# Patient Record
Sex: FEMALE | Race: BLACK OR AFRICAN AMERICAN | Employment: FULL TIME | URBAN - METROPOLITAN AREA
[De-identification: names, ages, dates, MRNs, and addresses within clinical notes are randomized per-mention and may not be internally consistent; named-entity substitution may affect disease eponyms.]

---
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
## 2019-03-07 ENCOUNTER — APPOINTMENT (OUTPATIENT)
Dept: CT IMAGING | Age: 19
End: 2019-03-07
Attending: EMERGENCY MEDICINE
Payer: MEDICAID

## 2019-03-07 ENCOUNTER — HOSPITAL ENCOUNTER (EMERGENCY)
Age: 19
Discharge: HOME | End: 2019-03-07
Attending: PEDIATRICS
Payer: MEDICAID

## 2019-03-07 VITALS
SYSTOLIC BLOOD PRESSURE: 116 MMHG | TEMPERATURE: 99 F | OXYGEN SATURATION: 98 % | RESPIRATION RATE: 16 BRPM | WEIGHT: 132.28 LBS | HEART RATE: 77 BPM | BODY MASS INDEX: 26.72 KG/M2 | DIASTOLIC BLOOD PRESSURE: 72 MMHG

## 2019-03-07 DIAGNOSIS — L03.211 CELLULITIS OF FACE: Primary | ICD-10-CM

## 2019-03-07 LAB
ANION GAP BLD CALC-SCNC: 17 MMOL/L (ref 10–20)
BASOPHILS # BLD: 0.1 K/UL (ref 0–0.1)
BASOPHILS NFR BLD: 1 % (ref 0–1)
BUN BLD-MCNC: 4 MG/DL (ref 9–20)
CA-I BLD-MCNC: 1.24 MMOL/L (ref 1.12–1.32)
CHLORIDE BLD-SCNC: 104 MMOL/L (ref 98–107)
CO2 BLD-SCNC: 27 MMOL/L (ref 21–32)
COMMENT, HOLDF: NORMAL
CREAT BLD-MCNC: 0.5 MG/DL (ref 0.6–1.3)
DIFFERENTIAL METHOD BLD: ABNORMAL
EOSINOPHIL # BLD: 0.2 K/UL (ref 0–0.4)
EOSINOPHIL NFR BLD: 4 % (ref 0–7)
ERYTHROCYTE [DISTWIDTH] IN BLOOD BY AUTOMATED COUNT: 13.4 % (ref 11.5–14.5)
GLUCOSE BLD-MCNC: 63 MG/DL (ref 65–100)
HCT VFR BLD AUTO: 30.2 % (ref 35–47)
HCT VFR BLD CALC: 30 % (ref 35–47)
HGB BLD-MCNC: 9.1 G/DL (ref 11.5–16)
IMM GRANULOCYTES # BLD AUTO: 0 K/UL (ref 0–0.04)
IMM GRANULOCYTES NFR BLD AUTO: 0 % (ref 0–0.5)
LYMPHOCYTES # BLD: 1.3 K/UL (ref 0.8–3.5)
LYMPHOCYTES NFR BLD: 23 % (ref 12–49)
MCH RBC QN AUTO: 25 PG (ref 26–34)
MCHC RBC AUTO-ENTMCNC: 30.1 G/DL (ref 30–36.5)
MCV RBC AUTO: 83 FL (ref 80–99)
MONOCYTES # BLD: 0.4 K/UL (ref 0–1)
MONOCYTES NFR BLD: 7 % (ref 5–13)
NEUTS SEG # BLD: 3.6 K/UL (ref 1.8–8)
NEUTS SEG NFR BLD: 65 % (ref 32–75)
NRBC # BLD: 0 K/UL (ref 0–0.01)
NRBC BLD-RTO: 0 PER 100 WBC
PLATELET # BLD AUTO: 379 K/UL (ref 150–400)
PMV BLD AUTO: 10.4 FL (ref 8.9–12.9)
POTASSIUM BLD-SCNC: 3.4 MMOL/L (ref 3.5–5.1)
RBC # BLD AUTO: 3.64 M/UL (ref 3.8–5.2)
SAMPLES BEING HELD,HOLD: NORMAL
SERVICE CMNT-IMP: ABNORMAL
SODIUM BLD-SCNC: 143 MMOL/L (ref 136–145)
WBC # BLD AUTO: 5.5 K/UL (ref 3.6–11)

## 2019-03-07 PROCEDURE — 80047 BASIC METABLC PNL IONIZED CA: CPT

## 2019-03-07 PROCEDURE — 99284 EMERGENCY DEPT VISIT MOD MDM: CPT

## 2019-03-07 PROCEDURE — 85025 COMPLETE CBC W/AUTO DIFF WBC: CPT

## 2019-03-07 PROCEDURE — 74011636320 HC RX REV CODE- 636/320: Performed by: PEDIATRICS

## 2019-03-07 PROCEDURE — 36415 COLL VENOUS BLD VENIPUNCTURE: CPT

## 2019-03-07 PROCEDURE — 70491 CT SOFT TISSUE NECK W/DYE: CPT

## 2019-03-07 PROCEDURE — 74011000258 HC RX REV CODE- 258: Performed by: PEDIATRICS

## 2019-03-07 RX ORDER — IBUPROFEN 600 MG/1
600 TABLET ORAL
Qty: 20 TAB | Refills: 0 | OUTPATIENT
Start: 2019-03-07 | End: 2020-10-27

## 2019-03-07 RX ORDER — CLINDAMYCIN HYDROCHLORIDE 150 MG/1
300 CAPSULE ORAL EVERY 6 HOURS
Status: DISCONTINUED | OUTPATIENT
Start: 2019-03-07 | End: 2019-03-07

## 2019-03-07 RX ORDER — ACETAMINOPHEN 325 MG/1
650 TABLET ORAL
Status: DISCONTINUED | OUTPATIENT
Start: 2019-03-07 | End: 2019-03-07

## 2019-03-07 RX ORDER — CLINDAMYCIN HYDROCHLORIDE 150 MG/1
600 CAPSULE ORAL
Status: DISCONTINUED | OUTPATIENT
Start: 2019-03-07 | End: 2019-03-07

## 2019-03-07 RX ORDER — SODIUM CHLORIDE 0.9 % (FLUSH) 0.9 %
10 SYRINGE (ML) INJECTION
Status: COMPLETED | OUTPATIENT
Start: 2019-03-07 | End: 2019-03-07

## 2019-03-07 RX ADMIN — Medication 10 ML: at 13:43

## 2019-03-07 RX ADMIN — IOPAMIDOL 100 ML: 612 INJECTION, SOLUTION INTRAVENOUS at 13:43

## 2019-03-07 RX ADMIN — SODIUM CHLORIDE 100 ML: 900 INJECTION, SOLUTION INTRAVENOUS at 13:43

## 2019-03-07 NOTE — ED PROVIDER NOTES
23 YO F  here for eval of abscess, which started approx 4 months ago. She was given PO abx which resolved the abscess. The abscess is noted to the left side of her face. She noticed in February she had swelling and tenderness at the same sight however the redness started last week. Denies fever,chills, cough, congestion. No meds PTA. Immunizations: UTD  PMH: HTN  Surgery: n/a  NKA      The history is provided by the patient. Past Medical History:   Diagnosis Date    Diabetes ([facility] [address]     Type II, pt states border-line diabetic (denied diabetes on 18 visit)    Hypertension        History reviewed. No pertinent surgical history. History reviewed. No pertinent family history. Social History     Socioeconomic History    Marital status: SINGLE     Spouse name: Not on file    Number of children: Not on file    Years of education: Not on file    Highest education level: Not on file   Social Needs    Financial resource strain: Not on file    Food insecurity - worry: Not on file    Food insecurity - inability: Not on file    Transportation needs - medical: Not on file   [employer] needs - non-medical: Not on file   Occupational History    Not on file   Tobacco Use    Smoking status: Current Every Day Smoker     Packs/day: 0.25    Smokeless tobacco: Never Used    Tobacco comment: 3-5 black and milds a day   Substance and Sexual Activity    Alcohol use: No    Drug use: No    Sexual activity: Yes     Partners: Male     Birth control/protection: None   Other Topics Concern    Not on file   Social History Narrative    Not on file         ALLERGIES: Patient has no known allergies. Review of Systems   Constitutional: Negative for activity change, appetite change, chills and fever. HENT: Negative for congestion. Respiratory: Negative for cough. Gastrointestinal: Negative for diarrhea, nausea and vomiting. Endocrine: Negative. Genitourinary: Negative.     Skin: Positive for wound. Neurological: Negative. All other systems reviewed and are negative. Vitals:    03/07/19 1106 03/07/19 1108   BP: 120/64    Pulse: 83    Resp: 18    Temp: 98.4 °F (36.9 °C)    SpO2: 100%    Weight:  60 kg (132 lb 4.4 oz)            Physical Exam   Constitutional: She is oriented to person, place, and time. She appears well-developed and well-nourished. No distress. HENT:   Head: Normocephalic and atraumatic. Mouth/Throat: Oropharynx is clear and moist. Dental caries present. Large, red, raised area noted to left side of face along mandible. Eyes: EOM are normal.   Neck: Normal range of motion. Cardiovascular: Normal rate and regular rhythm. Pulmonary/Chest: Effort normal. No respiratory distress. Abdominal: Soft. There is no tenderness. Musculoskeletal: Normal range of motion. Neurological: She is alert and oriented to person, place, and time. Skin: Skin is warm. Capillary refill takes less than 2 seconds. She is not diaphoretic. Nursing note and vitals reviewed. MDM  Number of Diagnoses or Management Options  Cellulitis of face:   Diagnosis management comments: 23 YO F here for on-going abscess. PE with erythematous, edematous swelling noted to face just along mandible without any fever/ n/v/d. Questionable involvement d/t dental caries, some tenderness with palpation of gum. Will check cbc/chem and get CT with contrast.     Labwork WNL. CT shows tooth abscess that erodes into mandible. Consulted ENT, Dr. Jessica Hoskins returned call who advised admission if oral surgery will see here and remove tooth. Spoke with DR. Aileen Crocker at [address] [place] [address] surgery who advised patient to come to office now to have exam completed for possible in office drainage vs OR. Will have CT placed on disk and send complete read of CT plus lab results. Does not want ABX prior to discharge from ED. Attending saw pt. Child has been re-examined and appears well.  Child is active, interactive and appears well hydrated. Laboratory tests, medications, x-rays, diagnosis, follow up plan and return instructions have been reviewed and discussed with the family. Family has had the opportunity to ask questions about their child's care. Family expresses understanding and agreement with care plan, follow up and return instructions. Family agrees to return the child to the ER in 48 hours if their symptoms are not improving or immediately if they have any change in their condition. Family understands to follow up with their pediatrician as instructed to ensure resolution of the issue seen for today.          Amount and/or Complexity of Data Reviewed  Clinical lab tests: ordered  Discuss the patient with other providers: yes (Pepito Knows  )    Patient Progress  Patient progress: stable         Procedures

## 2019-03-07 NOTE — CALL BACK NOTE
Patient called and stated that she cannot get into see the oral surgeon until she gets her medicaid fixed and wants to know if she can get a prescription for pain medicine because she is in a lot of pain. Pt also stated that she called [facility] but is unable to get an appointment with them until next week. Spoke to [place], [address] and a prescription for ibuprofen will be written. Patient notified and states she will come back to the ER to  prescription. After reviewing chart pt has pharmacy list and medication will be e-prescribed to the [address] on [address] and [facility] [facility]. Pt called back and notified that the prescription will be sent. Pt said thank you for e-prescribing because she doesn't have a car and was going to have to find a ride back to the ER.

## 2019-03-07 NOTE — DISCHARGE INSTRUCTIONS

## 2019-03-07 NOTE — CALL BACK NOTE
Patient called requesting a referral to another oral surgeon. After speaking with , this nurse spoke to the patient. The patient stated that she needed a referral to another oral surgeon because the oral surgeon she was referred to does not accept medicaid and was requesting 30% prior to starting procedure. Discussed with patient that all oral surgeons would request an initial payment and then set up a payment plan. This nurse stated that the only dental practice this nurse could think of that would most likely accept her medicaid was [facility] .  Pt states \"I have medicaid this shouldn't cost me anything. Can I please have the number to [facility].\"  Patient provided number to [facility].  Patient then hung up the phone.

## 2019-06-09 ENCOUNTER — HOSPITAL ENCOUNTER (EMERGENCY)
Age: 19
Discharge: HOME | End: 2019-06-09
Attending: EMERGENCY MEDICINE
Payer: SELF-PAY

## 2019-06-09 VITALS
HEART RATE: 81 BPM | TEMPERATURE: 97.9 F | WEIGHT: 123 LBS | HEIGHT: 59 IN | DIASTOLIC BLOOD PRESSURE: 65 MMHG | SYSTOLIC BLOOD PRESSURE: 122 MMHG | RESPIRATION RATE: 15 BRPM | OXYGEN SATURATION: 100 % | BODY MASS INDEX: 24.8 KG/M2

## 2019-06-09 DIAGNOSIS — K04.7 DENTAL ABSCESS: Primary | ICD-10-CM

## 2019-06-09 DIAGNOSIS — L98.9 FINGER LESION: ICD-10-CM

## 2019-06-09 DIAGNOSIS — L02.01 FACIAL ABSCESS: ICD-10-CM

## 2019-06-09 DIAGNOSIS — K02.9 DENTAL CARIES: ICD-10-CM

## 2019-06-09 PROCEDURE — 99282 EMERGENCY DEPT VISIT SF MDM: CPT

## 2019-06-09 RX ORDER — CLINDAMYCIN HYDROCHLORIDE 300 MG/1
300 CAPSULE ORAL 4 TIMES DAILY
Qty: 28 CAP | Refills: 0 | Status: SHIPPED | OUTPATIENT
Start: 2019-06-09 | End: 2019-06-16

## 2019-06-09 RX ORDER — IBUPROFEN 800 MG/1
800 TABLET ORAL
Qty: 20 TAB | Refills: 0 | Status: SHIPPED | OUTPATIENT
Start: 2019-06-09 | End: 2019-06-16

## 2019-06-09 RX ORDER — ACETAMINOPHEN 500 MG
1000 TABLET ORAL
Qty: 20 TAB | Refills: 0 | OUTPATIENT
Start: 2019-06-09 | End: 2020-10-27

## 2019-06-09 NOTE — ED PROVIDER NOTES
EMERGENCY DEPARTMENT HISTORY AND PHYSICAL EXAM      Date: 6/9/2019  Patient Name: Doe Christianson    History of Presenting Illness     Chief Complaint   Patient presents with    Jaw Pain    Finger Swelling     left 2nd finger, since Feb, r/t finger sticks with pregnacy       History Provided By: Patient    HPI: Doe Christianson, 20 y.o. female with PMHx significant for borderline diabetes, HTN, tobacco abuse presents ambulatory to the ED with cc of acute moderate intermittent painful red lesion to cheek/jawline X 7 months. Patient endorses she first noticed a rash in February. Patient followed up at [facility] ED on 3/7/2019 was diagnosed with cellulitis on the left side of her face. Patient had a CT neck soft tissue with contrast at visit which showed odontogenic phlegmon with subcutaneous soft tissue of the left base extending into the left mandibular buccal space, where there is periapical lucency and a radicular cyst at the roots of the left mandibular molar teeth (#18 and 19) with associated erosion of the lingual and buccal cortices of the mandible, with overlying cellulitis of the face. Per chart review and patient account, patient was to go to [facility] oral surgery same day in the office to have procedure patient was unable to be seen in the office because she did not have the money at the time and insurance did not cover. Patient has scheduled appointment in September with [facility]. Patient endorses that her symptoms are not as bad as they were in March, but she continues to have drainage, pain, intermittent swelling to the left side of her face. Denies fever, chills, nausea, vomiting, trismus, dysphasia, throat swelling, tongue swelling, headaches, lightheadedness, dizziness. No medications or modifying factors at present. No recent antibiotics. Pt additionally notes small swelling to left distal index finger since she has multiple BG checks in 2/2019 for her pregnancy.  No modifying factors. Endorses intermittent bleeding. Denies numbness/tinglnig, fever, chills, n/v, joint pain. There are no other complaints, changes, or physical findings at this time. PCP: Amie Koenig NP    No current facility-administered medications on file prior to encounter. Current Outpatient Medications on File Prior to Encounter   Medication Sig Dispense Refill    ibuprofen (MOTRIN) 600 mg tablet Take 1 Tab by mouth every six (6) hours as needed for Pain. 20 Tab 0    prenatal vit no.109-iron-FA 40 mg iron- 1 mg chew Take 1 Tab by mouth daily. 20 Tab 0    Omeprazole delayed release (PRILOSEC D/R) 20 mg tablet Take 1 Tab by mouth daily. 30 Tab 0       Past History     Past Medical History:  Past Medical History:   Diagnosis Date    Diabetes ([facility] [address]     Type II, pt states border-line diabetic (denied diabetes on 6/21/18 visit)    Hypertension        Past Surgical History:  History reviewed. No pertinent surgical history. Family History:  History reviewed. No pertinent family history. Social History:  Social History     Tobacco Use    Smoking status: Former Smoker     Packs/day: 0.25    Smokeless tobacco: Never Used    Tobacco comment: 3-5 black and milds a day   Substance Use Topics    Alcohol use: No    Drug use: No       Allergies:  No Known Allergies      Review of Systems   Review of Systems   Constitutional: Negative. Negative for activity change, chills, fatigue and fever. HENT: Positive for dental problem and facial swelling. Negative for congestion, drooling, ear discharge, ear pain, hearing loss, mouth sores, nosebleeds, postnasal drip, rhinorrhea, sinus pressure, sore throat and trouble swallowing. Eyes: Negative. Negative for pain and discharge. Respiratory: Negative. Negative for cough, chest tightness and shortness of breath. Cardiovascular: Negative. Negative for chest pain. Gastrointestinal: Negative for constipation, diarrhea, nausea and vomiting. Genitourinary: Negative. Musculoskeletal: Negative. Negative for joint swelling. Skin: Positive for rash. Neurological: Negative. Negative for dizziness, speech difficulty, light-headedness and headaches. Psychiatric/Behavioral: Negative. Physical Exam   Physical Exam   Constitutional: She is oriented to person, place, and time. She appears well-developed and well-nourished. No distress. HENT:   Head: Normocephalic and atraumatic. Right Ear: Hearing, tympanic membrane, external ear and ear canal normal.   Left Ear: Hearing, tympanic membrane, external ear and ear canal normal.   Nose: Nose normal. No sinus tenderness. Right sinus exhibits no maxillary sinus tenderness and no frontal sinus tenderness. Left sinus exhibits no maxillary sinus tenderness and no frontal sinus tenderness. Mouth/Throat: Uvula is midline, oropharynx is clear and moist and mucous membranes are normal. Mucous membranes are not dry. No trismus in the jaw. Abnormal dentition. Dental caries present. No uvula swelling. No oropharyngeal exudate, posterior oropharyngeal edema, posterior oropharyngeal erythema or tonsillar abscesses. Significant decay to #18-20 dentition. No intraoral drainable abscess appreciated. Eyes: Pupils are equal, round, and reactive to light. Conjunctivae and EOM are normal.   Neck: Normal range of motion, full passive range of motion without pain and phonation normal. No tracheal tenderness, no spinous process tenderness and no muscular tenderness present. No neck rigidity. No edema, no erythema and normal range of motion present. Pulmonary/Chest: Effort normal. No stridor. No respiratory distress. Speaking in clear sentencesin NAD. Musculoskeletal: Normal range of motion. Hands:  Neurological: She is alert and oriented to person, place, and time. Skin: Skin is warm and dry. She is not diaphoretic. Psychiatric: She has a normal mood and affect.  Her behavior is normal. Judgment and thought content normal.   Nursing note and vitals reviewed. Diagnostic Study Results     Labs -   No results found for this or any previous visit (from the past 12 hour(s)). Radiologic Studies -   No orders to display     CT Results  (Last 48 hours)    None        CXR Results  (Last 48 hours)    None            Medical Decision Making   I am the first provider for this patient. I reviewed the vital signs, available nursing notes, past medical history, past surgical history, family history and social history. Vital Signs-Reviewed the patient's vital signs. Patient Vitals for the past 12 hrs:   Temp Pulse Resp BP SpO2   06/09/19 1220 97.9 °F (36.6 °C) 81 15 122/65 100 %       Pulse Oximetry Analysis - 100% on RA    Records Reviewed: Nursing Notes, Old Medical Records, Previous Radiology Studies and Previous Laboratory Studies    Provider Notes (Medical Decision Making):   21 yo F with history of subacute left facial cyst vs abscess x 7 months. CT 3/7/2019 shows tooth abscess that erodes into mandible. Pt was scheduled to see [facility] Oral surgery in office same day but pt was rescheduled till the following day and then informed her insurance would not cover the cost and she did not have the money at the time. Pt [facility] appointment scheduled with [facility], but she states her symptoms continue to recur (swelling, drainage, pain). No constitutional sxs or systemic involvement. No trismus or airway compromise. Stable exam and vitals. DDx: abscess, cyst, cellulitis, osteomyelitis, dental caries/infection/ abscess. Plan to consult [facility] surgery. ED Course:   Initial assessment performed. The patients presenting problems have been discussed, and they are in agreement with the care plan formulated and outlined with them. I have encouraged them to ask questions as they arise throughout their visit.     ED Course as of Jun 09 1326   [facility] Jun 09, 2019   1249 I reviewed pt's hx, sxs, vitals, and PE w/ attending, Dr. Heriberto Zamora. He is in agreement with the care plan and recommends attempting to reach out to [address] [place] [address] Surgeon to discuss an earlier appointment for the pt and whether repeat imaging or labs would be indicated at this time. []   1313   1:13 PM    I spoke with Dr. Yuriy Marcus, Consult for [address] [place] [address] Oral Surgery. Discussed available diagnostic tests and clinical findings. He is in agreement with care plans as outlined. He recommends pt to report to [place] [address] [facility] to have assessment and dental surgery prn. No transfer/admission/further imaging/labs indicated at this time. Recommends discharge on Clindamycin and analgesics. Paulo Jo      []      ED Course User Index  [] Herb Koenig PA-C       Critical Care Time:   0    Disposition:  1:20 PM  I have discussed with patient their diagnosis, treatment, and follow up plan. The patient agrees to follow up as outlined in discharge paperwork and also to return to the ED with any worsening. Rey Cabral PA-C    PLAN:  1. Current Discharge Medication List      START taking these medications    Details   clindamycin (CLEOCIN) 300 mg capsule Take 1 Cap by mouth four (4) times daily for 7 days. Qty: 28 Cap, Refills: 0      !! ibuprofen (MOTRIN) 800 mg tablet Take 1 Tab by mouth every six (6) hours as needed for Pain for up to 7 days. Qty: 20 Tab, Refills: 0      acetaminophen (TYLENOL) 500 mg tablet Take 2 Tabs by mouth every six (6) hours as needed for Pain. Qty: 20 Tab, Refills: 0       !! - Potential duplicate medications found. Please discuss with provider. CONTINUE these medications which have NOT CHANGED    Details   !! ibuprofen (MOTRIN) 600 mg tablet Take 1 Tab by mouth every six (6) hours as needed for Pain. Qty: 20 Tab, Refills: 0      prenatal vit no.109-iron-FA 40 mg iron- 1 mg chew Take 1 Tab by mouth daily.   Qty: 20 Tab, Refills: 0      Omeprazole delayed release (PRILOSEC D/R) 20 mg tablet Take 1 Tab by mouth daily. Qty: 30 Tab, Refills: 0       !! - Potential duplicate medications found. Please discuss with provider. 2.   Follow-up Information     Follow up With Specialties Details Why Contact Info    [facility]  Call in 1 day As needed [address] [address]  [phone] [address] in 1 day As needed; M-W 8AM-3PM [address]  [phone]    Dermatology [address] [place] [address]  Schedule an appointment as soon as possible for a visit in 1 week As needed, If symptoms worsen [facility] [phone]        Return to ED if worse     Diagnosis     Clinical Impression:   1. Dental abscess    2. Dental caries    3. Facial abscess    4. Finger lesion        Attestations:    Please note that this dictation was completed with Dragon, computer voice recognition software. Quite often unanticipated grammatical, syntax, homophones, and other interpretive errors are inadvertently transcribed by the computer software. Please disregard these errors. Additionally, please excuse any errors that have escaped final proofreading.

## 2019-06-09 NOTE — ED TRIAGE NOTES
Per patient, comes to the ED for treatment of left lower jaw pain.   Stated she was told she has a cyst and needed to see an Oral Surgeon

## 2019-06-09 NOTE — ED NOTES
Patient here with c/o left lower jaw pain. Patient reports hx of chronic cyst/abscess to let face. Patient states that she was seen before, states that she has been on antibiotics in the past, however states that problem has been re-occurrent. Patient denies fevers. Patient states it has recently popped and drained. Patient reports that she has an appointment to see a specialist at [facility] however states that the earliest appointment they could offer her is in November. Emergency Department Nursing Plan of Care       The Nursing Plan of Care is developed from the Nursing assessment and Emergency Department Attending provider initial evaluation. The plan of care may be reviewed in the ED Provider note.     The Plan of Care was developed with the following considerations:   Patient / Family readiness to learn indicated by:verbalized understanding  Persons(s) to be included in education: patient  Barriers to Learning/Limitations:No    Signed     Emilia Rodriguez RN    6/9/2019   12:32 PM

## 2020-10-27 ENCOUNTER — HOSPITAL ENCOUNTER (EMERGENCY)
Age: 20
Discharge: HOME | End: 2020-10-27
Attending: EMERGENCY MEDICINE

## 2020-10-27 VITALS
HEIGHT: 59 IN | WEIGHT: 123 LBS | OXYGEN SATURATION: 100 % | DIASTOLIC BLOOD PRESSURE: 54 MMHG | TEMPERATURE: 98.5 F | HEART RATE: 97 BPM | RESPIRATION RATE: 16 BRPM | SYSTOLIC BLOOD PRESSURE: 131 MMHG | BODY MASS INDEX: 24.8 KG/M2

## 2020-10-27 DIAGNOSIS — K04.7 DENTAL ABSCESS: Primary | ICD-10-CM

## 2020-10-27 DIAGNOSIS — K02.9 DENTAL DECAY: ICD-10-CM

## 2020-10-27 LAB — HCG UR QL: NEGATIVE

## 2020-10-27 PROCEDURE — 81025 URINE PREGNANCY TEST: CPT

## 2020-10-27 PROCEDURE — 74011000250 HC RX REV CODE- 250: Performed by: NURSE PRACTITIONER

## 2020-10-27 PROCEDURE — 74011250637 HC RX REV CODE- 250/637: Performed by: NURSE PRACTITIONER

## 2020-10-27 PROCEDURE — 99283 EMERGENCY DEPT VISIT LOW MDM: CPT

## 2020-10-27 RX ORDER — IBUPROFEN 400 MG/1
800 TABLET ORAL
Status: COMPLETED | OUTPATIENT
Start: 2020-10-27 | End: 2020-10-27

## 2020-10-27 RX ORDER — CLINDAMYCIN HYDROCHLORIDE 300 MG/1
300 CAPSULE ORAL 2 TIMES DAILY
Qty: 14 CAP | Refills: 0 | Status: SHIPPED | OUTPATIENT
Start: 2020-10-27 | End: 2020-11-03

## 2020-10-27 RX ORDER — TRAMADOL HYDROCHLORIDE 50 MG/1
50 TABLET ORAL
Qty: 4 TAB | Refills: 0 | Status: SHIPPED | OUTPATIENT
Start: 2020-10-27 | End: 2020-11-01

## 2020-10-27 RX ADMIN — DIPHENHYDRAMINE HYDROCHLORIDE: 12.5 LIQUID ORAL at 14:09

## 2020-10-27 RX ADMIN — IBUPROFEN 800 MG: 400 TABLET, FILM COATED ORAL at 14:07

## 2020-10-27 NOTE — LETTER
[facility] EMERGENCY DEPT 
[address] [id number] 
[phone] Work/School Note Date: 10/27/2020 To Whom It May concern: 
 
Francine Webster was seen and treated today in the emergency room by the following provider(s): 
Attending Provider: Sumeet Arguelles MD 
Nurse Practitioner: Phyllis Bartholomew NP. Francine Webster may return to work on 10/30/2020. Sincerely, Aguilar Barriga NP

## 2020-10-27 NOTE — DISCHARGE INSTRUCTIONS
Patient Education        Tooth Decay: Care Instructions  Your Care Instructions    Tooth decay is damage to a tooth caused by plaque. Plaque is a thin film of bacteria that sticks to the teeth above and below the gum line. If plaque isn't removed from the teeth, it can build up and harden into tartar. The bacteria in plaque and tartar use sugars in food to make acids. These acids can cause tooth decay and gum disease. Any part of your tooth can decay, from the roots below the gum line to the chewing surface. Decay can affect the outer layer (enamel) or inner layer (dentin) of your teeth. The deeper the decay, the worse the damage. Untreated tooth decay will get worse and may lead to tooth loss. If you have a small hole (cavity) in your tooth, your dentist can repair it by removing the decay and filling the hole. If you have deeper decay, you may need more treatment. A very badly damaged tooth may have to be removed. Follow-up care is a key part of your treatment and safety. Be sure to make and go to all appointments, and call your dentist if you are having problems. It's also a good idea to know your test results and keep a list of the medicines you take. How can you care for yourself at home? If you have pain:  · Take an over-the-counter pain medicine, such as acetaminophen (Tylenol), ibuprofen (Advil, Motrin), or naproxen (Aleve). Be safe with medicines. Read and follow all instructions on the label. ? Do not take two or more pain medicines at the same time unless the doctor told you to. Many pain medicines have acetaminophen, which is Tylenol. Too much acetaminophen (Tylenol) can be harmful. · Put ice or a cold pack on your cheek over the tooth for 10 to 15 minutes at a time. Put a thin cloth between the ice and your skin. To prevent tooth decay  · Brush teeth twice a day, and floss once a day. Brushing with fluoride toothpaste and flossing may be enough to reverse early decay.   · Use a toothbrush with soft, rounded-end bristles and a head that is small enough to reach all parts of your teeth and mouth. Replace your toothbrush every 3 or 4 months. You may also use an electric toothbrush that has rotating and oscillating (back-and-forth) action. · Ask your dentist about having fluoride treatments at the dental office. · Brush your tongue to help get rid of bacteria. · Eat healthy foods that include whole grains, vegetables, and fruits. · Have your teeth cleaned by a professional at least two times a year. · Do not smoke or use smokeless tobacco. Tobacco can make tooth decay worse. When should you call for help? Call 911 anytime you think you may need emergency care. For example, call if:    · You have trouble breathing. Call your dentist now or seek immediate medical care if:    · You have new or worse symptoms of infection, such as:  ? Increased pain, swelling, warmth, or redness. ? Red streaks leading from the area. ? Pus draining from the area. ? A fever. Watch closely for changes in your health, and be sure to contact your doctor if:    · You do not get better as expected. Where can you learn more? Go to http://www.[employer].com/  Enter F057 in the search box to learn more about \"Tooth Decay: Care Instructions. \"  Current as of: March 25, 2020               Content Version: 12.6  © 7913-9702 [employer]. Care instructions adapted under license by [employer] (which disclaims liability or warranty for this information). If you have questions about a medical condition or this instruction, always ask your healthcare professional. [place] [address] any warranty or liability for your use of this information.

## 2020-10-27 NOTE — ED PROVIDER NOTES
EMERGENCY DEPARTMENT HISTORY AND PHYSICAL EXAM    Date: 10/27/2020  Patient Name: Silviano Alatorre    History of Presenting Illness     Chief Complaint   Patient presents with    Jaw Swelling     left         History Provided By: Patient        HPI: Silviano Alatorre is a 23 y.o. female with a PMH of HTN and diabetes who presents with jaw swelling. Onset 2 days ago. Located to left lower jaw. Patient states she has a fractured tooth that needs to be pulled. States similar symptoms approximately 1 year ago and was informed to follow-up with oral surgeon for removal.  States at that time she was unable to have any services completed due to her insurance. States she has not followed up since then and that was June 2019. Reports no problems until 2 days ago. States pain with eating. Denies fever, chills. States symptoms are not as bad as they were 1 year ago. States a year ago symptoms resolved after antibiotic use. No relief with Tylenol and ibuprofen during this acute episode. PCP: Mary Anne NP    Current Facility-Administered Medications   Medication Dose Route Frequency Provider Last Rate Last Dose    ibuprofen (MOTRIN) tablet 800 mg  800 mg Oral NOW Rogelio Huerta NP        dental ball (lidocaine/Benadryl/Cetacaine) mixture   Mucous Membrane ONCE Rogelio Huerta NP         Current Outpatient Medications   Medication Sig Dispense Refill    clindamycin (CLEOCIN) 300 mg capsule Take 1 Cap by mouth two (2) times a day for 7 days. 14 Cap 0    traMADoL (Ultram) 50 mg tablet Take 1 Tab by mouth every six (6) hours as needed for Pain for up to 5 days. Max Daily Amount: 200 mg. 4 Tab 0       Past History     Past Medical History:  Past Medical History:   Diagnosis Date    Diabetes ([facility] [address]     Type II, pt states border-line diabetic (denied diabetes on 6/21/18 visit)    Hypertension        Past Surgical History:  History reviewed. No pertinent surgical history. Family History:  History reviewed.  No pertinent family history. Social History:  Social History     Tobacco Use    Smoking status: Current Every Day Smoker     Packs/day: 0.25    Smokeless tobacco: Never Used    Tobacco comment: 3-5 black and milds a day   Substance Use Topics    Alcohol use: No    Drug use: No       Allergies:  No Known Allergies      Review of Systems   Review of Systems   Constitutional: Negative for chills and fever. HENT: Positive for dental problem and facial swelling. Negative for congestion, drooling, ear pain, mouth sores and rhinorrhea. Respiratory: Negative for cough. Cardiovascular: Negative for chest pain. Gastrointestinal: Negative for abdominal pain, nausea and vomiting. Musculoskeletal: Negative for arthralgias. Skin: Negative for color change and rash. Neurological: Negative for headaches. All other systems reviewed and are negative. Physical Exam     Vitals:    10/27/20 1253   BP: (!) 124/58   Pulse: (!) 102   Resp: 16   Temp: 98.5 °F (36.9 °C)   SpO2: 100%   Weight: 55.8 kg (123 lb)   Height: 4' 11\" (1.499 m)     Physical Exam  Vitals signs and nursing note reviewed. Constitutional:       General: She is not in acute distress. Appearance: She is well-developed. She is not ill-appearing or toxic-appearing. HENT:      Head: Normocephalic and atraumatic. Right Ear: Tympanic membrane, ear canal and external ear normal.      Left Ear: Tympanic membrane, ear canal and external ear normal.      Nose: Nose normal.      Mouth/Throat:      Mouth: Mucous membranes are moist.      Dentition: Abnormal dentition. Pharynx: Oropharynx is clear. Eyes:      Extraocular Movements: Extraocular movements intact. Conjunctiva/sclera: Conjunctivae normal.      Pupils: Pupils are equal, round, and reactive to light. Neck:      Musculoskeletal: Normal range of motion and neck supple. Cardiovascular:      Rate and Rhythm: Normal rate and regular rhythm. Pulses: Normal pulses. Heart sounds: Normal heart sounds. Pulmonary:      Effort: Pulmonary effort is normal.      Breath sounds: Normal breath sounds. Neurological:      Mental Status: She is alert and oriented to person, place, and time. Psychiatric:         Thought Content: Thought content normal.           Diagnostic Study Results     Labs -   No results found for this or any previous visit (from the past 12 hour(s)). Radiologic Studies -   No orders to display     CT Results  (Last 48 hours)    None        CXR Results  (Last 48 hours)    None            Medical Decision Making   I am the first provider for this patient. I reviewed the vital signs, available nursing notes, past medical history, past surgical history, family history and social history. Vital Signs-Reviewed the patient's vital signs. Records Reviewed: Nursing Notes and Old Medical Records            Disposition:  Discharged  DISCHARGE NOTE:   1:42 PM          Care plan outlined and precautions discussed. Patient has no new complaints, changes, or physical findings. All of pt's questions and concerns were addressed. Patient was instructed and agrees to follow up with dentistry, as well as to return to the ED upon further deterioration. Patient is ready to go home. Follow-up Information    None         Current Discharge Medication List      START taking these medications    Details   clindamycin (CLEOCIN) 300 mg capsule Take 1 Cap by mouth two (2) times a day for 7 days. Qty: 14 Cap, Refills: 0      traMADoL (Ultram) 50 mg tablet Take 1 Tab by mouth every six (6) hours as needed for Pain for up to 5 days. Max Daily Amount: 200 mg.   Qty: 4 Tab, Refills: 0    Associated Diagnoses: Dental abscess; Dental decay         STOP taking these medications       acetaminophen (TYLENOL) 500 mg tablet Comments:   Reason for Stopping:         ibuprofen (MOTRIN) 600 mg tablet Comments:   Reason for Stopping:         prenatal vit no.109-iron-FA 40 mg iron- 1 mg chew Comments:   Reason for Stopping:         Omeprazole delayed release (PRILOSEC D/R) 20 mg tablet Comments:   Reason for Stopping:               Provider Notes (Medical Decision Making):   DDX: Dental Abscess, Dentalgia, Tooth Avulsion, Dental Caries    Procedures:  Procedures    Please note that this dictation was completed with Dragon, computer voice recognition software. Quite often unanticipated grammatical, syntax, homophones, and other interpretive errors are inadvertently transcribed by the computer software. Please disregard these errors. Additionally, please excuse any errors that have escaped final proofreading. Diagnosis     Clinical Impression:   1. Dental abscess    2.  Dental decay

## 2020-10-27 NOTE — ED TRIAGE NOTES
Pt reports pain is returning to her left jaw where she previously had an abscess that was left untreated while she was pregnant.

## 2020-10-27 NOTE — ED NOTES
Pt in with left jaw swelling x 2 days. She reports a similar episode with abscess to left lower tooth in 2019, was pregnant and scheduled for surgery but had insurance difficulties. Pt states abscess went away on it's own but reappeared in the same place 2 days ago. She has left lower dental pain, left jaw swelling. She denies shortness of breath or difficulty swallowing but does report difficulty talking due to pain. Pt sitting on the bed talking on the phone, appears in no distress at this time. Emergency Department Nursing Plan of Care       The Nursing Plan of Care is developed from the Nursing assessment and Emergency Department Attending provider initial evaluation. The plan of care may be reviewed in the ED Provider note.     The Plan of Care was developed with the following considerations:   Patient / Family readiness to learn indicated by:verbalized understanding  Persons(s) to be included in education: patient  Barriers to Learning/Limitations:No    Signed     Santiago Simon RN    10/27/2020   1:23 PM

## 2022-03-11 ENCOUNTER — HOSPITAL ENCOUNTER (EMERGENCY)
Age: 22
Discharge: HOME | End: 2022-03-12
Attending: EMERGENCY MEDICINE

## 2022-03-11 VITALS
TEMPERATURE: 98.4 F | SYSTOLIC BLOOD PRESSURE: 148 MMHG | HEIGHT: 59 IN | DIASTOLIC BLOOD PRESSURE: 74 MMHG | HEART RATE: 114 BPM | OXYGEN SATURATION: 99 % | WEIGHT: 126 LBS | BODY MASS INDEX: 25.4 KG/M2 | RESPIRATION RATE: 20 BRPM

## 2022-03-11 DIAGNOSIS — R22.0 FACIAL SWELLING: Primary | ICD-10-CM

## 2022-03-11 DIAGNOSIS — K04.7 DENTAL INFECTION: ICD-10-CM

## 2022-03-11 DIAGNOSIS — Z72.0 TOBACCO ABUSE: ICD-10-CM

## 2022-03-11 DIAGNOSIS — K08.89 DENTALGIA: ICD-10-CM

## 2022-03-11 PROCEDURE — 99283 EMERGENCY DEPT VISIT LOW MDM: CPT

## 2022-03-11 NOTE — Clinical Note
[facility] EMERGENCY DEPT  [address] [address] [phone]    Work/School Note    Date: 3/11/2022    To Whom It May concern:    Charmayne Cosier was seen and treated today in the emergency room by the following provider(s):  Attending Provider: Kofi Cornelius MD.      Charmayne Cosier is excused from work/school on 3/12/2022 through 3/14/2022. She is medically clear to return to work/school on 3/15/2022.          Sincerely,          Davida Montero MD

## 2022-03-12 PROCEDURE — 74011250637 HC RX REV CODE- 250/637: Performed by: EMERGENCY MEDICINE

## 2022-03-12 RX ORDER — OXYCODONE AND ACETAMINOPHEN 5; 325 MG/1; MG/1
1 TABLET ORAL
Status: COMPLETED | OUTPATIENT
Start: 2022-03-12 | End: 2022-03-12

## 2022-03-12 RX ORDER — DEXAMETHASONE SODIUM PHOSPHATE 100 MG/10ML
10 INJECTION INTRAMUSCULAR; INTRAVENOUS
Status: COMPLETED | OUTPATIENT
Start: 2022-03-12 | End: 2022-03-12

## 2022-03-12 RX ORDER — DEXAMETHASONE SODIUM PHOSPHATE 100 MG/10ML
10 INJECTION INTRAMUSCULAR; INTRAVENOUS
Status: DISCONTINUED | OUTPATIENT
Start: 2022-03-12 | End: 2022-03-12 | Stop reason: SDUPTHER

## 2022-03-12 RX ORDER — OXYCODONE AND ACETAMINOPHEN 5; 325 MG/1; MG/1
1 TABLET ORAL
Qty: 10 TABLET | Refills: 0 | Status: SHIPPED | OUTPATIENT
Start: 2022-03-12 | End: 2022-03-17

## 2022-03-12 RX ORDER — CLINDAMYCIN HYDROCHLORIDE 150 MG/1
300 CAPSULE ORAL 3 TIMES DAILY
Qty: 42 CAPSULE | Refills: 0 | Status: SHIPPED | OUTPATIENT
Start: 2022-03-12 | End: 2022-03-19

## 2022-03-12 RX ORDER — NAPROXEN 500 MG/1
500 TABLET ORAL 2 TIMES DAILY WITH MEALS
Qty: 20 TABLET | Refills: 0 | Status: SHIPPED | OUTPATIENT
Start: 2022-03-12

## 2022-03-12 RX ORDER — CLINDAMYCIN HYDROCHLORIDE 150 MG/1
300 CAPSULE ORAL
Status: COMPLETED | OUTPATIENT
Start: 2022-03-12 | End: 2022-03-12

## 2022-03-12 RX ADMIN — DEXAMETHASONE SODIUM PHOSPHATE 10 MG: 10 INJECTION INTRAMUSCULAR; INTRAVENOUS at 01:20

## 2022-03-12 RX ADMIN — OXYCODONE HYDROCHLORIDE AND ACETAMINOPHEN 1 TABLET: 5; 325 TABLET ORAL at 00:53

## 2022-03-12 RX ADMIN — CLINDAMYCIN HYDROCHLORIDE 300 MG: 150 CAPSULE ORAL at 01:12

## 2022-03-12 NOTE — ED NOTES
Patient  given copy of dc instructions and 0 paper script(s) and 4 electronic scripts. Patient  verbalized understanding of instructions and script (s). Patient given a current medication reconciliation form and verbalized understanding of their medications. Patient  verbalized understanding of the importance of discussing medications with  his or her physician or clinic they will be following up with. Patient alert and oriented and in no acute distress. Patient offered wheelchair from treatment area to hospital entrance, patient denies wheelchair.

## 2022-03-12 NOTE — DISCHARGE INSTRUCTIONS
Emergency [address] by [facility]  [address], [address]  Open M, W, F: 8AM - 5PM and T, Th: 8AM-6PM  Phone: [phone], press 4  $70 for Emergency Care  $60 for first routine care, then pay by sliding scale based upon income. [facility]  [address]  Phone: [phone]    [facility] [employer] [facility]  [address]  Open Monday - Friday 8AM - 4:30 PM  Phone: [address] [place] [address] Dentistry Urgent [facility], [address] [place] [address]   Phone: [phone] to confirm a time for emergency treatment   Pediatrics: [phone]   $75 per tooth, extractions only     [address] Dentistry, [address], [place] [address], 2nd Floor, [address] [place] [address] starting at 8:30 AM - 3 PM [address] [place] [address]. [place], [address] [address]   Phone [phone] or [phone]   Hours [id number] (extractions)   Simple tooth extraction: $60 per tooth, $55 per x-ray     [facility] Residents only, over the age of 25  Phone: [phone]. Leave message saying you need an appointment to register. Hours: Tuesday Evenings    [facility] (in [place])   [facility] AT [place] only   Phone: [phone], leave message saying you need an appointment to register   Hours: Wed 6-9p     Non-Urgent Izzy GILLESPIE [address] [facility] at [address]  [facility]   Dental Clinic: [phone]   Oral Surgery Clinic: [phone]

## 2022-03-13 NOTE — ED PROVIDER NOTES
EMERGENCY DEPARTMENT HISTORY AND PHYSICAL EXAM      Please note that this dictation was completed with the assistance of \"Dragon\", the computer voice recognition software. Quite often unanticipated grammatical, syntax, homophones, and other interpretive errors are inadvertently transcribed by the computer software. Please disregard these errors and any errors that have escaped final proofreading. Thank you. Patient: Yennifer Vivas  DOS: 21  : 2000  MRN: [id number]  History of Presenting Illness     Chief Complaint   Patient presents with    Skin Problem     c/o \"abscess\" on jaw that has been there for several years. patient states she is unable to find someone to fix it. History Provided By: Patient/family/EMS (if applicable)    HPI: Yennifer Vivas, 24 y.o. female with past medical history as documented below presents to the ED with c/o of acute on chronic left facial/jaw swelling and pain. Pt notes hx of chronic \"facial infection\" and had an appt with a dentist a week ago but it didn't accept her insurance. Pt is asking for something to help with her pain. Denies sore throat, voice changes or dysphagia. She has tried OTC meds without relief. Pt denies any other exacerbating or ameliorating factors. Additionally, pt specifically denies any recent fever, chills, headache, nausea, vomiting, abdominal pain, CP, SOB, lightheadedness, dizziness, numbness, weakness, lower extremity swelling, heart palpitations, urinary sxs, diarrhea, constipation, melena, hematochezia, cough, or congestion. There are no other complaints, changes or physical findings pertinent to the HPI at this time. PCP: Nicolas Pham NP  Past History   Past Medical History:  Past Medical History:   Diagnosis Date    Diabetes ([facility] [address]     Type II, pt states border-line diabetic (denied diabetes on 18 visit)    Hypertension        Past Surgical History:  History reviewed. No pertinent surgical history.     Family History: Family history reviewed and was non-contributory, unless specified below:  History reviewed. No pertinent family history. Social History:  Social History     Tobacco Use    Smoking status: Current Every Day Smoker     Packs/day: 0.25    Smokeless tobacco: Never Used    Tobacco comment: 3-5 black and milds a day   Substance Use Topics    Alcohol use: No    Drug use: No       Allergies:  No Known Allergies    Current Medications:  No current facility-administered medications on file prior to encounter. No current outpatient medications on file prior to encounter. Review of Systems   A complete ROS was reviewed by me today and all other systems negative, unless otherwise specified below:  Review of Systems   Constitutional: Negative. Negative for chills and fever. HENT: Positive for dental problem and facial swelling. Negative for congestion and sore throat. Eyes: Negative. Respiratory: Negative. Negative for cough, chest tightness, shortness of breath and wheezing. Cardiovascular: Negative. Negative for chest pain, palpitations and leg swelling. Gastrointestinal: Negative. Negative for abdominal distention, abdominal pain, blood in stool, constipation, diarrhea, nausea and vomiting. Endocrine: Negative. Genitourinary: Negative. Negative for dysuria, flank pain, frequency, hematuria and urgency. Musculoskeletal: Negative. Negative for arthralgias, back pain and myalgias. Skin: Negative. Negative for color change and rash. Neurological: Negative. Negative for dizziness, syncope, speech difficulty, weakness, light-headedness, numbness and headaches. Hematological: Negative. Psychiatric/Behavioral: Negative. Negative for confusion and self-injury. The patient is not nervous/anxious. All other systems reviewed and are negative. Physical Exam   Physical Exam  Vitals and nursing note reviewed. Constitutional:       General: She is not in acute distress. Appearance: She is well-developed. She is not diaphoretic. HENT:      Head: Normocephalic and atraumatic. Comments: TTP teeth #19, 20, 21 with dental caries with indurated gingival mucuosa, no palpable dental or facial abscess. Floor of mouth soft. Uvula midline, voice is clear. Mouth/Throat:      Pharynx: No oropharyngeal exudate. Eyes:      Conjunctiva/sclera: Conjunctivae normal.      Pupils: Pupils are equal, round, and reactive to light. Cardiovascular:      Rate and Rhythm: Normal rate and regular rhythm. Heart sounds: Normal heart sounds. No murmur heard. No friction rub. No gallop. Pulmonary:      Effort: Pulmonary effort is normal. No respiratory distress. Breath sounds: Normal breath sounds. No wheezing or rales. Chest:      Chest wall: No tenderness. Abdominal:      General: Bowel sounds are normal. There is no distension. Palpations: Abdomen is soft. There is no mass. Tenderness: There is no abdominal tenderness. There is no guarding or rebound. Musculoskeletal:         General: No tenderness or deformity. Normal range of motion. Cervical back: Normal range of motion. Skin:     General: Skin is warm. Findings: No rash. Neurological:      Mental Status: She is alert and oriented to person, place, and time. Cranial Nerves: No cranial nerve deficit. Motor: No abnormal muscle tone. Coordination: Coordination normal.      Deep Tendon Reflexes: Reflexes normal.       Diagnostic Study Results     Laboratory Data  I have personally reviewed and interpreted all available laboratory results. No results found for this or any previous visit (from the past 24 hour(s)). Radiologic Studies   I have personally reviewed and interpreted all available imaging studies and agree with radiology interpretation.   No orders to display     CT Results  (Last 48 hours)    None        CXR Results  (Last 48 hours)    None        Medical Decision Making   I am the first and primary ED physician for this patient's ED visit today. I reviewed our electronic medical record system for any past medical records that may contribute to the patient's current condition, including their past medical history, surgical history, social and family history. This also includes their most recent ED visits, previous hospitalizations and prior diagnostic data. I have reviewed and summarized the most pertinent findings in my HPI and MDM. Vital Signs Reviewed: Marlene Carlisle Visit Vitals  BP (!) 148/74 (BP 1 Location: Left upper arm, BP Patient Position: At rest)   Pulse (!) 114   Temp 98.4 °F (36.9 °C)   Resp 20   Ht 4' 11\" (1.499 m)   Wt 57.2 kg (126 lb)   SpO2 99%   BMI 25.45 kg/m²       Records Reviewed: Nursing Notes, Old Medical Records, Previous electrocardiograms, Previous Radiology Studies and Previous Laboratory Studies, EMS reports    Provider Notes (Medical Decision Making):   Patient presents with dental pain. Stable vitals and exam without obvious abscess that needs drainage. Airway stable and patient speaking in full sentences. No red flags that make PTA, RPA, ludwigs angina concerning. Will tx with dental ball, dental block PRN, antibiotics and outpatient analgesics. Pt was given information on dentists and importance of followup and no smoking. ED Course:   Initial assessment performed. I discussed presenting problems and concerns, and my formulated plan for today's visit with the patient and any available family members. I have encouraged them to ask questions as they arise throughout the visit. Social History     Tobacco Use    Smoking status: Current Every Day Smoker     Packs/day: 0.25    Smokeless tobacco: Never Used    Tobacco comment: 3-5 black and milds a day   Substance Use Topics    Alcohol use: No    Drug use: No     TOBACCO COUNSELING:  Upon evaluation, pt expressed that they are a current tobacco user.  For approximately 3-5 mins, pt has been counseled on the dangers of smoking and was encouraged to quit as soon as possible in order to decrease further risks to their health. Pt has conveyed their understanding of the risks involved should they continue to use tobacco products. ED Orders Placed:  Orders Placed This Encounter    oxyCODONE-acetaminophen (PERCOCET) 5-325 mg per tablet 1 Tablet    clindamycin (CLEOCIN) capsule 300 mg    DISCONTD: dexamethasone (DECADRON) 10 mg/mL Oral 10 mg    oxyCODONE-acetaminophen (Percocet) 5-325 mg per tablet    naproxen (NAPROSYN) 500 mg tablet    clindamycin (CLEOCIN) 150 mg capsule    benzocaine (ORAJEL) 20 % gel topical gel    dexamethasone (DECADRON) 10 mg/mL Oral 10 mg       ED Medications Administered During ED Course:  Medications   oxyCODONE-acetaminophen (PERCOCET) 5-325 mg per tablet 1 Tablet (1 Tablet Oral Given 3/12/22 0053)   clindamycin (CLEOCIN) capsule 300 mg (300 mg Oral Given 3/12/22 0112)   dexamethasone (DECADRON) 10 mg/mL Oral 10 mg (10 mg Oral Given 3/12/22 0120)        Progress Note:  I have just re-evaluated the patient. Patient reports improvement of sx's. I have reviewed Her vital signs and determined there is currently no worsening in their condition or physical exam. Results have been reviewed with them and their questions have been answered. We will continue to review further results as they come available. Progress Note:  Pt reassessed and symptoms noted to have improved significantly after ED treatment. Pt is clinically stable for discharge. Kimber Price's labs and imaging have been reviewed with her and available family. She verbally conveys understanding and agreement of the signs, symptoms, diagnosis, treatment and prognosis and additionally agrees to follow up as recommended with Dr. Jakob Harrell, NP and/or specialist as instructed. She agrees with the care plan we have created and conveys that all of her questions have been answered.  Additionally, I have put together a packet of discharge instructions for her that include: 1) educational information regarding their diagnosis, 2) how to care for their diagnosis at home, as well a 3) list of reasons why they would want to return to the ED prior to their follow-up appointment should the patient's condition change or symptoms worsen. I have answered all questions to the patient's satisfaction. Strict return precautions given. She conveyed understanding and agreement with care plan. Vital signs stable for discharge. Disposition:  DISCHARGE  The pt is ready for discharge. The pt's signs, symptoms, diagnosis, and discharge instructions have been discussed and pt has conveyed their understanding. The pt is to follow up as recommended or return to ER should their symptoms worsen. Plan has been discussed and pt is in agreement. Plan:  1. Return precautions as discussed with patient and available family/caregiver. 2.   Discharge Medication List as of 3/12/2022  1:11 AM      START taking these medications    Details   oxyCODONE-acetaminophen (Percocet) 5-325 mg per tablet Take 1 Tablet by mouth every eight (8) hours as needed for Pain for up to 5 days. Max Daily Amount: 3 Tablets. Indications: pain, Normal, Disp-10 Tablet, R-0      naproxen (NAPROSYN) 500 mg tablet Take 1 Tablet by mouth two (2) times daily (with meals). , Normal, Disp-20 Tablet, R-0      clindamycin (CLEOCIN) 150 mg capsule Take 2 Capsules by mouth three (3) times daily for 7 days. , Normal, Disp-42 Capsule, R-0      benzocaine (ORAJEL) 20 % gel topical gel Use as directed, Normal, Disp-30 g, R-0           3.    Follow-up Information     Follow up With Specialties Details Why [address] [facility] EMERGENCY DEPT Emergency Medicine  As needed, If symptoms worsen [address], [place] Nurse Practitioner, Pediatric Medicine  As needed, If symptoms worsen [address] [facility]  [phone] Instructed to return to ED if worse  Diagnosis   Clinical Impression:  1. Facial swelling    2. Dentalgia    3. Dental infection    4. Tobacco abuse      Attestation:  Evelyn Flores MD, am the attending of record for this patient. I personally performed the services described in this documentation on this date, 3/11/2022 for patientDeepa. I have reviewed the chart and verified that the record is accurate and complete.

## 2022-05-23 ENCOUNTER — HOSPITAL ENCOUNTER (EMERGENCY)
Age: 22
Discharge: HOME | End: 2022-05-23
Attending: EMERGENCY MEDICINE

## 2022-05-23 VITALS
SYSTOLIC BLOOD PRESSURE: 113 MMHG | OXYGEN SATURATION: 100 % | HEART RATE: 81 BPM | RESPIRATION RATE: 16 BRPM | DIASTOLIC BLOOD PRESSURE: 63 MMHG | TEMPERATURE: 97.6 F

## 2022-05-23 DIAGNOSIS — L02.01 FACIAL ABSCESS: Primary | ICD-10-CM

## 2022-05-23 PROCEDURE — 74011000250 HC RX REV CODE- 250: Performed by: NURSE PRACTITIONER

## 2022-05-23 PROCEDURE — 99283 EMERGENCY DEPT VISIT LOW MDM: CPT

## 2022-05-23 PROCEDURE — 75810000289 HC I&D ABSCESS SIMP/COMP/MULT

## 2022-05-23 RX ORDER — CEPHALEXIN 500 MG/1
500 CAPSULE ORAL 4 TIMES DAILY
Qty: 28 CAPSULE | Refills: 0 | Status: SHIPPED | OUTPATIENT
Start: 2022-05-23 | End: 2022-05-30

## 2022-05-23 RX ORDER — LIDOCAINE HYDROCHLORIDE 20 MG/ML
10 INJECTION, SOLUTION INFILTRATION; PERINEURAL
Status: COMPLETED | OUTPATIENT
Start: 2022-05-23 | End: 2022-05-23

## 2022-05-23 RX ORDER — HYDROCODONE BITARTRATE AND ACETAMINOPHEN 5; 325 MG/1; MG/1
1 TABLET ORAL
Qty: 18 TABLET | Refills: 0 | Status: SHIPPED | OUTPATIENT
Start: 2022-05-23 | End: 2022-05-26

## 2022-05-23 RX ADMIN — LIDOCAINE HYDROCHLORIDE 200 MG: 20 INJECTION, SOLUTION INFILTRATION; PERINEURAL at 12:51

## 2022-05-23 NOTE — ED PROVIDER NOTES
This is a 20year old female who presents with c/o a left sided cheek abscess. Patient states she has had this intermittently for approximately 3 years. Has seen a dentist that says they cannot repair it because of insurance purposes and the need to have money down. Patient states she intermittently pops the abscess however it is gotten so big and so painful that is actually \"keeping her up at night. \"  Patient denies any fevers or chills. No chest pain, shortness of breath, dizziness, nausea or vomiting. No difficulty swallowing. Has placed hot packs and allowed it to come to ahead and it will drain but this time its not draining at all. Comes to the emergency room for further evaluation and treatment. Was seen by her primary care doctor who put her on clindamycin without relief of her symptoms. There are no further complaints at this time. Celina Perez NP  Past Medical History:  No date: Diabetes [facility])      Comment:  Type II, pt states border-line diabetic (denied diabetes               on 6/21/18 visit)  No date: Hypertension  No past surgical history on file. Past Medical History:   Diagnosis Date    Diabetes ([facility] [address]     Type II, pt states border-line diabetic (denied diabetes on 6/21/18 visit)    Hypertension        No past surgical history on file. No family history on file.     Social History     Socioeconomic History    Marital status: SINGLE     Spouse name: Not on file    Number of children: Not on file    Years of education: Not on file    Highest education level: Not on file   Occupational History    Not on file   Tobacco Use    Smoking status: Current Every Day Smoker     Packs/day: 0.25    Smokeless tobacco: Never Used    Tobacco comment: 3-5 black and milds a day   Substance and Sexual Activity    Alcohol use: No    Drug use: No    Sexual activity: Yes     Partners: Male     Birth control/protection: None   Other Topics Concern    Not on file   Social History Narrative    Not on file     Social Determinants of Health     Financial Resource Strain:     Difficulty of Paying Living Expenses: Not on file   Food Insecurity:     Worried About Running Out of Food in the Last Year: Not on file    Christopher of Food in the Last Year: Not on file   Transportation Needs:     Lack of Transportation (Medical): Not on file    Lack of Transportation (Non-Medical): Not on file   Physical Activity:     Days of Exercise per Week: Not on file    Minutes of Exercise per Session: Not on file   Stress:     Feeling of Stress : Not on file   Social Connections:     Frequency of Communication with Friends and Family: Not on file    Frequency of Social Gatherings with Friends and Family: Not on file    Attends Church Services: Not on file    Active Member of [address] [place] [address] [employer] or Organizations: Not on file    Attends Club or Organization Meetings: Not on file    Marital Status: Not on file   Intimate Partner Violence:     Fear of Current or Ex-Partner: Not on file    Emotionally Abused: Not on file    Physically Abused: Not on file    Sexually Abused: Not on file   Housing Stability:     Unable to Pay for Housing in the Last Year: Not on file    Number of [place] in the Last Year: Not on file    Unstable Housing in the Last Year: Not on file         ALLERGIES: Patient has no known allergies. Review of Systems   Constitutional: Negative for chills, diaphoresis, fatigue and fever. HENT: Negative for congestion, ear discharge, ear pain and trouble swallowing. Eyes: Negative for photophobia, pain, redness and visual disturbance. Respiratory: Negative for chest tightness and shortness of breath. Cardiovascular: Negative for chest pain. Gastrointestinal: Negative for nausea and vomiting. Endocrine: Negative. Musculoskeletal: Negative for neck pain and neck stiffness. Skin: Positive for color change (left cheek with abscess). Negative for pallor, rash and wound. Allergic/Immunologic: Negative. Neurological: Negative for dizziness, speech difficulty, weakness and headaches. Hematological: Does not bruise/bleed easily. Psychiatric/Behavioral: Positive for agitation and behavioral problems. The patient is not nervous/anxious. Patient agitated and yelling at this provider in triage       Vitals:    05/23/22 1118   BP: 113/63   Pulse: 81   Resp: 16   Temp: 97.6 °F (36.4 °C)   SpO2: 100%            Physical Exam  Vitals and nursing note reviewed. Constitutional:       General: She is not in acute distress. Appearance: Normal appearance. She is well-developed. She is not ill-appearing. HENT:      Head: Normocephalic and atraumatic. Right Ear: External ear normal.      Left Ear: External ear normal.      Nose: Nose normal. No congestion. Mouth/Throat:      Mouth: Mucous membranes are moist.   Eyes:      General:         Right eye: No discharge. Left eye: No discharge. Conjunctiva/sclera: Conjunctivae normal.      Pupils: Pupils are equal, round, and reactive to light. Neck:      Vascular: No JVD. Trachea: No tracheal deviation. Cardiovascular:      Rate and Rhythm: Normal rate and regular rhythm. Pulses: Normal pulses. Pulmonary:      Effort: Pulmonary effort is normal. No respiratory distress. Abdominal:      General: There is no distension. Tenderness: There is no guarding. Genitourinary:     Comments: Deferred    Musculoskeletal:         General: No tenderness. Normal range of motion. Cervical back: Normal range of motion and neck supple. No tenderness. Skin:     General: Skin is warm. Coloration: Skin is not pale. Findings: No erythema or rash. Comments: Left cheek with abscess approximately a quarter sized. No drainage. Neurological:      General: No focal deficit present. Mental Status: She is alert and oriented to person, place, and time. Motor: No weakness. Coordination: Coordination normal.   Psychiatric:         Behavior: Behavior normal.         Thought Content: Thought content normal.         Judgment: Judgment normal.      Comments: Agitated and yelling in triage. MDM  Number of Diagnoses or Management Options  Facial abscess: established and worsening  Diagnosis management comments: Differential diagnosis includes facial abscess. Patient with 10 out of 10 pain in triage upon initial assessment. Patient states \"I need relief this is killing me. \"  Patient agreed to needle stick in left cheek abscess to give port of exit until full I&D can be performed. Patient tolerated well and was placed in the emergency room waiting area until procedure room available. Patient initially in agreement with plan of care. When called back for procedure in triage room, patient became agitated and flailing her arms making it an unsafe environment for this provider to proceed. Tried to explain to patient her options of p.o. antibiotics and discharged home, I&D in this hospital for drainage of the abscess and p.o. antibiotics or follow-up with general surgery and p.o. antibiotics. Patient became even more agitated, police at bedside. Option of incision and drainage ceased. Dr. Manoj Henry at bedside and discharge the patient to home.          I&D ABCESS COMP/MULTIPLE    Date/Time: 5/23/2022 12:10 PM  Performed by: Redd Stanford NP  Authorized by: Redd Stanford NP     Consent:     Consent obtained:  Verbal    Consent given by:  Patient    Risks discussed:  Bleeding, incomplete drainage, pain, infection and damage to other organs    Alternatives discussed:  No treatment  Location:     Type:  Abscess    Size:  Quarter sized    Location:  Head    Head location:  Face  Pre-procedure details:     Skin preparation:  Antiseptic wash  Procedure type:     Complexity:  Simple  Procedure details:     Needle aspiration: yes      Needle size:  18 G    Incision depth: Dermal  Post-procedure details:     Patient tolerance of procedure: Tolerated well, no immediate complications  Comments:      Single needle stick to the left cheek, positive drainage of small amount thick purulent fluid. Will need full incision and drainage.

## 2023-07-09 ENCOUNTER — HOSPITAL ENCOUNTER (EMERGENCY)
Facility: HOSPITAL | Age: 23
Discharge: HOME | End: 2023-07-09

## 2023-07-09 VITALS
DIASTOLIC BLOOD PRESSURE: 65 MMHG | HEART RATE: 103 BPM | TEMPERATURE: 97.6 F | SYSTOLIC BLOOD PRESSURE: 110 MMHG | RESPIRATION RATE: 16 BRPM | OXYGEN SATURATION: 98 %

## 2023-07-09 PROCEDURE — 99283 EMERGENCY DEPT VISIT LOW MDM: CPT

## 2023-07-09 PROCEDURE — 6370000000 HC RX 637 (ALT 250 FOR IP): Performed by: NURSE PRACTITIONER

## 2023-07-09 RX ORDER — ACETAMINOPHEN 500 MG
1000 TABLET ORAL ONCE
Status: COMPLETED | OUTPATIENT
Start: 2023-07-09 | End: 2023-07-09

## 2023-07-09 RX ADMIN — ACETAMINOPHEN 500 MG: 500 TABLET ORAL at 11:58

## 2023-07-09 ASSESSMENT — PAIN DESCRIPTION - DESCRIPTORS: DESCRIPTORS: ACHING

## 2023-07-09 ASSESSMENT — ENCOUNTER SYMPTOMS: ABDOMINAL PAIN: 1

## 2023-07-09 ASSESSMENT — PAIN DESCRIPTION - LOCATION: LOCATION: HEAD

## 2023-07-09 ASSESSMENT — PAIN SCALES - GENERAL: PAINLEVEL_OUTOF10: 8

## 2023-07-17 ENCOUNTER — HOSPITAL ENCOUNTER (EMERGENCY)
Facility: HOSPITAL | Age: 23
Discharge: LEFT BEFORE DISCHARGE | End: 2023-07-17

## 2023-07-17 VITALS
TEMPERATURE: 98.3 F | RESPIRATION RATE: 17 BRPM | HEART RATE: 93 BPM | DIASTOLIC BLOOD PRESSURE: 64 MMHG | SYSTOLIC BLOOD PRESSURE: 124 MMHG

## 2023-07-17 PROCEDURE — 59025 FETAL NON-STRESS TEST: CPT

## 2023-07-17 PROCEDURE — 99283 EMERGENCY DEPT VISIT LOW MDM: CPT

## 2023-07-18 NOTE — PROGRESS NOTES
1950 pt arrives via wheelchair accompanied by stepmom with c/o vaginal pressure and irregular ctx. Pt seen at [facility]. Pt reports having a h/a since Saturday with no relief from 1000mg tylenol. Pt denies swelling, blurry vision, RUQ pain, VB, LOF. Pt reports GFM. EFM applied and CNM notified    2020 to pt room with Jayne Lomeli, [address]. SVE 1cm. Offered pt pain meds for h/a and ctx pain, recheck SVE in 1-2 hours. Pt refuses. Would like to be induced here. Explained to pt that there is no indication and she can call her OB to schedule. 2030 to pt room to go over what pt desires. Pt and family member not in room.  Left AMA 100 feet

## 2023-07-18 NOTE — ED PROVIDER NOTES
Department of Obstetrics and Gynecology  Nurse Practitioner [facility] Obstetrics History and Physical        CHIEF COMPLAINT:  contractions, headache, pelvic pressure    HISTORY OF PRESENT ILLNESS:      The patient is a 24 y.o.  4 parity 2012 at 45w4d with an MARY ANN of 23. Patient presents to [facility] for a headache that started three days ago and is not relieved with tylenol. Reports headache is in bilateral temple area and constant, a 9/10 on the pain scale. Denies visual disturbances, RUQ pain and N/V. Reports she has had about 60 ounces of water to drink today. Also reports constant pelvic pressure, worse with sitting, it is a 9/10 on the pain scale, denies lof, vb and irregular vaginal discharge. States it feels like she needs to have a BM, but is afraid to push due to pressure. Also reports ctx, is unsure how often they are occurring, but they are frequent. Also a 9/10 on the pain scale. Endorses good fetal movement. Routine [facility] at [facility]. She she has not been going to her appt because they Karla Harm doing nothing\" for her. Reports she has asked for an IOL,, but they were unable to schedule in next three weeks. Reports she was told to just show [facility] to [facility] and an induction would be started. Requesting IOL today. PRENATAL CARE:    Provider:  [facility]     x 2, pre-eclampsia with those pregnancies. New FOB with this pregnancy. BP has been stable. GBS positive in urine. Anemia- has not been taking iron. B+/ RI.      Blood Type/Rh:  B Positive  Antibody Screen:  Negative  Rubella:  Immune  RPR:  non-reactive  Hepatitis B Surface Antigen: negative  HIV:  negative  Gonorrhea:  negative  Chlamydia:  negative  NIPT:  normal  U/S Structural Survery:  normal  1 hour Glucose Tolerance Test:  68  Group B Strep:  positive    OB History    Para Term  AB Living   4 2 2   1 2   SAB IAB Ectopic Molar Multiple Live Births             2      # Outcome Date GA Lbr Jose/2nd Weight Sex Delivery Anes PTL Lv

## 2024-02-02 NOTE — DISCHARGE INSTRUCTIONS
Goal Outcome Evaluation:   VSS. Alert x 4. Continuous EEG. External urinary catheter. PRN atarax administered. See MAR. Fetal US complete. Safety maintained. No complaints at this time.                                             Patient Education        Tooth Decay: Care Instructions  Your Care Instructions    Tooth decay is damage to a tooth caused by plaque. Plaque is a thin film of bacteria that sticks to the teeth above and below the gum line. If plaque isn't removed from the teeth, it can build up and harden into tartar. The bacteria in plaque and tartar use sugars in food to make acids. These acids can cause tooth decay and gum disease. Any part of your tooth can decay, from the roots below the gum line to the chewing surface. Decay can affect the outer layer (enamel) or inner layer (dentin) of your teeth. The deeper the decay, the worse the damage. Untreated tooth decay will get worse and may lead to tooth loss. If you have a small hole (cavity) in your tooth, your dentist can repair it by removing the decay and filling the hole. If you have deeper decay, you may need more treatment. A very badly damaged tooth may have to be removed. Follow-up care is a key part of your treatment and safety. Be sure to make and go to all appointments, and call your dentist if you are having problems. It's also a good idea to know your test results and keep a list of the medicines you take. How can you care for yourself at home? If you have pain:  · Take an over-the-counter pain medicine, such as acetaminophen (Tylenol), ibuprofen (Advil, Motrin), or naproxen (Aleve). Be safe with medicines. Read and follow all instructions on the label. ? Do not take two or more pain medicines at the same time unless the doctor told you to. Many pain medicines have acetaminophen, which is Tylenol. Too much acetaminophen (Tylenol) can be harmful. · Put ice or a cold pack on your cheek over the tooth for 10 to 15 minutes at a time. Put a thin cloth between the ice and your skin. To prevent tooth decay  · Brush teeth twice a day, and floss once a day. Brushing with fluoride toothpaste and flossing may be enough to reverse early decay.   · Use a toothbrush with soft, rounded-end bristles and a head that is small enough to reach all parts of your teeth and mouth. Replace your toothbrush every 3 or 4 months. You may also use an electric toothbrush that has rotating and oscillating (back-and-forth) action. · Ask your dentist about having fluoride treatments at the dental office. · Brush your tongue to help get rid of bacteria. · Eat healthy foods that include whole grains, vegetables, and fruits. · Have your teeth cleaned by a professional at least two times a year. · Do not smoke or use smokeless tobacco. Tobacco can make tooth decay worse. When should you call for help? Call 911 anytime you think you may need emergency care. For example, call if:    · You have trouble breathing.    Call your dentist now or seek immediate medical care if:    · You have new or worse symptoms of infection, such as:  ? Increased pain, swelling, warmth, or redness. ? Red streaks leading from the area. ? Pus draining from the area. ? A fever.    Watch closely for changes in your health, and be sure to contact your doctor if:    · You do not get better as expected. Where can you learn more? Go to http://mallory-lisa.info/. Enter L079 in the search box to learn more about \"Tooth Decay: Care Instructions. \"  Current as of: March 27, 2018  Content Version: 11.9  © 4189-3679 [employer]. Care instructions adapted under license by [employer] (which disclaims liability or warranty for this information). If you have questions about a medical condition or this instruction, always ask your healthcare professional. [place] [address] any warranty or liability for your use of this information.        Patient Education   Emergency [facility] by [address]  Open [facility], F: 8AM - 5PM and T, Th: 8AM-6PM  Phone: [phone], press 4  $70 for Emergency Care  $60 for first routine care, then pay by sliding scale based upon income. [facility]  [address], [address]  Phone: [phone]    [facility]  [address] [place] [address], [address]  Phone: [phone]  Fee: $75 Routine Extraction, $125 Complex Extraction  Open Monday - Friday 8AM - 5:00PM    [facility]  [address], [address]  Open Monday - Friday 8AM - 4:30 PM  Phone: [phone] of Dentistry Urgent [facility], [address] [place] [address], [place] [address], 1st Floor  [facility] starting at 8:30 AM M-F  Phone: [phone], press 2  Fee: $150 per tooth (x-ray & extractions only)  Pediatrics Phone[de-identified] [phone], 8-5 M-F    [address] [facility]istry, [facility], [place] [address], 2nd Floor, [address] [place] [address] starting at 8:30 AM - 3 PM [address] [place] [address]  [facility], [address] [place] [address]  Phone: [phone] or [phone]  Emergency Hours: 9:30AM - 11AM (extractions)  Simple tooth extraction $ per tooth. #75 for x-ray    [facility] Residents only, over the age of 25  Phone: [phone]. Leave message saying you need an appointment to register. Hours: Tuesday Evenings       Periodontal Abscess: Care Instructions  Your Care Instructions    A periodontal abscess is a pocket of pus in the tissues of the gum. It looks like a small red ball pushing out of the swollen gum. An abscess can occur with serious gum disease (periodontitis), which causes the gums to pull away from the teeth. This leaves deep pockets where bacteria can grow. If tartar builds up too much, or if food gets stuck in the pockets, pus forms. If the pus can't drain, it forms an abscess. An abscess can cause a fever and a throbbing pain in nearby teeth.  It can also cause long-term damage to your teeth and gums. The teeth may get loose and fall out. The infection can spread to another part of your body. In most cases, your dentist will give you antibiotics to stop the infection. He or she may need to cut open (monica) the abscess so that the infection can drain. This should relieve your pain. You may also need more dental treatment, such as tooth removal or oral surgery to fix bone damage caused by the abscess. Follow-up care is a key part of your treatment and safety. Be sure to make and go to all appointments, and call your doctor if you are having problems. It's also a good idea to know your test results and keep a list of the medicines you take. How can you care for yourself at home? · Reduce pain and swelling in your face and jaw by putting ice or a cold pack on the outside of your cheek for 10 to 20 minutes at a time. Put a thin cloth between the ice and your skin. · Be safe with medicines. Read and follow all instructions on the label. ? If the doctor gave you a prescription medicine for pain, take it as prescribed. ? If you are not taking a prescription pain medicine, ask your doctor if you can take an over-the-counter medicine. · Take your antibiotics as directed. Do not stop taking them just because you feel better. You need to take the full course of antibiotics. To prevent periodontal abscess  · Brush and floss every day, and have regular dental checkups. · Eat a healthy diet, and avoid sugary foods and drinks. · Do not smoke or use spit tobacco. Tobacco use slows your ability to heal. It also increases your risk for gum disease and cancer of the mouth and throat. If you need help quitting, talk to your doctor about stop-smoking programs and medicines. These can increase your chances of quitting for good. When should you call for help? Call 911 anytime you think you may need emergency care.  For example, call if:    · You have trouble breathing.    Call your doctor now or seek immediate medical care if:    · You have new or worse symptoms of infection, such as:  ? Increased pain, swelling, warmth, or redness. ? Red streaks leading from the area. ? Pus draining from the area. ? A fever.    Watch closely for changes in your health, and be sure to contact your doctor if:    · You do not get better as expected. Where can you learn more? Go to http://mallory-lisa.info/. Enter O179 in the search box to learn more about \"Periodontal Abscess: Care Instructions. \"  Current as of: March 27, 2018  Content Version: 11.9  © 4355-3041 [employer]. Care instructions adapted under license by [employer] (which disclaims liability or warranty for this information). If you have questions about a medical condition or this instruction, always ask your healthcare professional. [address] any warranty or liability for your use of this information.